# Patient Record
Sex: MALE | Race: WHITE | Employment: FULL TIME | URBAN - METROPOLITAN AREA
[De-identification: names, ages, dates, MRNs, and addresses within clinical notes are randomized per-mention and may not be internally consistent; named-entity substitution may affect disease eponyms.]

---

## 2017-08-18 ENCOUNTER — HOSPITAL ENCOUNTER (OUTPATIENT)
Age: 32
Discharge: HOME | End: 2017-08-18
Attending: PEDIATRICS
Payer: COMMERCIAL

## 2017-08-18 VITALS
RESPIRATION RATE: 22 BRPM | OXYGEN SATURATION: 99 % | HEART RATE: 100 BPM | DIASTOLIC BLOOD PRESSURE: 66 MMHG | TEMPERATURE: 99 F | SYSTOLIC BLOOD PRESSURE: 111 MMHG

## 2017-08-18 DIAGNOSIS — J01.90 ACUTE SINUSITIS, RECURRENCE NOT SPECIFIED, UNSPECIFIED LOCATION: Primary | ICD-10-CM

## 2017-08-18 DIAGNOSIS — J45.30 MILD PERSISTENT ASTHMA WITHOUT COMPLICATION: ICD-10-CM

## 2017-08-18 PROCEDURE — 99203 OFFICE O/P NEW LOW 30 MIN: CPT

## 2017-08-18 PROCEDURE — 99204 OFFICE O/P NEW MOD 45 MIN: CPT

## 2017-08-18 RX ORDER — AMOXICILLIN AND CLAVULANATE POTASSIUM 875; 125 MG/1; MG/1
1 TABLET, FILM COATED ORAL 2 TIMES DAILY
Qty: 20 TABLET | Refills: 0 | Status: SHIPPED | OUTPATIENT
Start: 2017-08-18 | End: 2017-08-28

## 2017-08-18 NOTE — ED NOTES
Pt given discharge instructions and prescriptions, verbalizes understanding. Denies further questions or needs. Encouraged to call with questions and go to ED with new or worsening symptoms.  Ambulatory out of immediate care with steady gait in no apparent

## 2017-08-18 NOTE — ED PROVIDER NOTES
Patient presents with:  Cough/URI      HPI:     Eduardo Gonzalez is a 27year old male who presents for evaluation of a chief complaint of upper respiratory symptoms for about 10 days. He states his symptoms started with a cold and clear nasal discharge.   This detailed discharge instructions for your condition today.     Follow Up with:  Madeleine in [facility]  [phone]    As needed

## 2017-08-18 NOTE — ED INITIAL ASSESSMENT (HPI)
Pt reports sinus pain, getting progressively worse over the last week, worse under eyes. Pt states he had a cold recently, had some clear nasal discharge but now states nasal discharge is now green in color. Denies fevers, sore throat.

## 2018-05-18 ENCOUNTER — CHARTING TRANS (OUTPATIENT)
Dept: OTHER | Age: 33
End: 2018-05-18

## 2018-11-01 VITALS
DIASTOLIC BLOOD PRESSURE: 80 MMHG | HEIGHT: 72 IN | WEIGHT: 265 LBS | TEMPERATURE: 98.6 F | BODY MASS INDEX: 35.89 KG/M2 | OXYGEN SATURATION: 98 % | HEART RATE: 97 BPM | SYSTOLIC BLOOD PRESSURE: 126 MMHG | RESPIRATION RATE: 18 BRPM

## 2021-10-14 ENCOUNTER — OFFICE VISIT (OUTPATIENT)
Dept: FAMILY MEDICINE CLINIC | Facility: CLINIC | Age: 36
End: 2021-10-14
Payer: COMMERCIAL

## 2021-10-14 VITALS
TEMPERATURE: 98 F | BODY MASS INDEX: 34.2 KG/M2 | DIASTOLIC BLOOD PRESSURE: 77 MMHG | RESPIRATION RATE: 16 BRPM | SYSTOLIC BLOOD PRESSURE: 124 MMHG | HEART RATE: 71 BPM | WEIGHT: 247 LBS | HEIGHT: 71.26 IN

## 2021-10-14 DIAGNOSIS — F41.9 ANXIETY: ICD-10-CM

## 2021-10-14 DIAGNOSIS — J45.20 MILD INTERMITTENT ASTHMA WITHOUT COMPLICATION: ICD-10-CM

## 2021-10-14 DIAGNOSIS — F32.A DEPRESSION, UNSPECIFIED DEPRESSION TYPE: ICD-10-CM

## 2021-10-14 DIAGNOSIS — Z00.00 ROUTINE PHYSICAL EXAMINATION: Primary | ICD-10-CM

## 2021-10-14 PROCEDURE — 99385 PREV VISIT NEW AGE 18-39: CPT | Performed by: FAMILY MEDICINE

## 2021-10-14 PROCEDURE — 3008F BODY MASS INDEX DOCD: CPT | Performed by: FAMILY MEDICINE

## 2021-10-14 PROCEDURE — 90471 IMMUNIZATION ADMIN: CPT | Performed by: FAMILY MEDICINE

## 2021-10-14 PROCEDURE — 90686 IIV4 VACC NO PRSV 0.5 ML IM: CPT | Performed by: FAMILY MEDICINE

## 2021-10-14 PROCEDURE — 3078F DIAST BP <80 MM HG: CPT | Performed by: FAMILY MEDICINE

## 2021-10-14 PROCEDURE — 3074F SYST BP LT 130 MM HG: CPT | Performed by: FAMILY MEDICINE

## 2021-10-14 RX ORDER — ARIPIPRAZOLE 5 MG/1
TABLET ORAL
COMMUNITY

## 2021-10-14 RX ORDER — DEXTROAMPHETAMINE/AMPHETAMINE 15 MG
CAPSULE, EXT RELEASE 24 HR ORAL
COMMUNITY
Start: 2021-10-05

## 2021-10-14 RX ORDER — ALBUTEROL SULFATE 90 UG/1
2 AEROSOL, METERED RESPIRATORY (INHALATION) EVERY 4 HOURS PRN
Qty: 1 EACH | Refills: 2 | Status: SHIPPED | OUTPATIENT
Start: 2021-10-14

## 2021-10-14 RX ORDER — BUPROPION HYDROCHLORIDE 150 MG/1
TABLET, EXTENDED RELEASE ORAL
COMMUNITY

## 2021-10-14 NOTE — PROGRESS NOTES
HPI:  43 yr old male who presents for physical.  New to clinic.  with a 2 yr girl. 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 Tries to walk and workout regularly. Lost 50+ pounds in the past few years. Eats healthy.      Diagnosed with asthma a rhythm; no murmurs  Lungs:  Clear to ausculation; good aeration               No wheezes, rales or rhonchi  Abd: soft, non-tender, non-distended          Normal bowel sounds; no masses          No hepatosplenomegaly  Extremities: No cyanosis, clubbing, mariel

## 2021-10-18 ENCOUNTER — PATIENT MESSAGE (OUTPATIENT)
Dept: FAMILY MEDICINE CLINIC | Facility: CLINIC | Age: 36
End: 2021-10-18

## 2021-10-18 NOTE — TELEPHONE ENCOUNTER
New patient, just had an establish care visit  on 10/14/21. Medication record not updated for Symbicort dose and SIG. [employer] message sent for clarification.          From: Manolo Schaefer  To: Rosie Smiley DO  Sent: 10/18/2021 12:22 PM CDT  Subject: Vidal Moreau

## 2021-10-20 RX ORDER — BUDESONIDE AND FORMOTEROL FUMARATE DIHYDRATE 80; 4.5 UG/1; UG/1
2 AEROSOL RESPIRATORY (INHALATION) 2 TIMES DAILY
Qty: 3 EACH | Refills: 1 | Status: SHIPPED | OUTPATIENT
Start: 2021-10-20

## 2022-04-11 ENCOUNTER — TELEPHONE (OUTPATIENT)
Dept: FAMILY MEDICINE CLINIC | Facility: CLINIC | Age: 37
End: 2022-04-11

## 2022-04-11 NOTE — TELEPHONE ENCOUNTER
Patient requesting to speak with Dr. Rafa Santos as soon as possible. Patient is having trouble obtaining his medication from his psychiatrist due to reception not allowing him to speak with her until his scheduled appointment in 4 weeks. patien would like to know if doctor Rafa Santos is able to refill until seen by his psychiatrist or assist him to obtain medication from her. Patient has been doing this for over a week with them with not success. Please advise. Patient is out.       ADDERALL 30 MG    LEXAPRIL 15 MG    WELBETRIN 150 MG

## 2022-04-12 NOTE — TELEPHONE ENCOUNTER
I am not very comfortable refilling these medications. Can we try and contact the Psychiatrist's office?   Thanks

## 2022-04-12 NOTE — TELEPHONE ENCOUNTER
Left message on patient's identified voicemail explaining that these medication refills should be coming from his psychiatrist and that all we can do is facilitate if he and the pharmacy are having problems contacting the psychiatrist. He would need to provide names and phone numbers for both the psychiatrist and the pharmacy that he regularly uses.

## 2022-09-19 RX ORDER — ALBUTEROL SULFATE 90 UG/1
2 AEROSOL, METERED RESPIRATORY (INHALATION) EVERY 4 HOURS PRN
Qty: 1 EACH | Refills: 2 | Status: SHIPPED | OUTPATIENT
Start: 2022-09-19

## 2022-09-20 RX ORDER — ALBUTEROL SULFATE 90 UG/1
2 AEROSOL, METERED RESPIRATORY (INHALATION) EVERY 4 HOURS PRN
Qty: 1 EACH | Refills: 2 | Status: SHIPPED | OUTPATIENT
Start: 2022-09-20

## 2023-10-05 ENCOUNTER — LAB ENCOUNTER (OUTPATIENT)
Dept: LAB | Age: 38
End: 2023-10-05
Attending: FAMILY MEDICINE
Payer: COMMERCIAL

## 2023-10-05 ENCOUNTER — OFFICE VISIT (OUTPATIENT)
Dept: FAMILY MEDICINE CLINIC | Facility: CLINIC | Age: 38
End: 2023-10-05

## 2023-10-05 VITALS
SYSTOLIC BLOOD PRESSURE: 120 MMHG | TEMPERATURE: 99 F | HEART RATE: 99 BPM | DIASTOLIC BLOOD PRESSURE: 76 MMHG | RESPIRATION RATE: 18 BRPM | HEIGHT: 71 IN | WEIGHT: 252 LBS | BODY MASS INDEX: 35.28 KG/M2

## 2023-10-05 DIAGNOSIS — F32.A DEPRESSION, UNSPECIFIED DEPRESSION TYPE: ICD-10-CM

## 2023-10-05 DIAGNOSIS — J45.20 MILD INTERMITTENT ASTHMA WITHOUT COMPLICATION: ICD-10-CM

## 2023-10-05 DIAGNOSIS — F41.9 ANXIETY: ICD-10-CM

## 2023-10-05 DIAGNOSIS — R39.89 URINARY PROBLEM: ICD-10-CM

## 2023-10-05 DIAGNOSIS — Z00.00 ROUTINE PHYSICAL EXAMINATION: ICD-10-CM

## 2023-10-05 DIAGNOSIS — Z00.00 ROUTINE PHYSICAL EXAMINATION: Primary | ICD-10-CM

## 2023-10-05 LAB
ALBUMIN SERPL-MCNC: 3.8 G/DL (ref 3.4–5)
ALBUMIN/GLOB SERPL: 0.9 {RATIO} (ref 1–2)
ALP LIVER SERPL-CCNC: 109 U/L
ALT SERPL-CCNC: 37 U/L
ANION GAP SERPL CALC-SCNC: 7 MMOL/L (ref 0–18)
AST SERPL-CCNC: 20 U/L (ref 15–37)
BILIRUB SERPL-MCNC: 0.3 MG/DL (ref 0.1–2)
BUN BLD-MCNC: 14 MG/DL (ref 7–18)
BUN/CREAT SERPL: 14.1 (ref 10–20)
CALCIUM BLD-MCNC: 9.1 MG/DL (ref 8.5–10.1)
CHLORIDE SERPL-SCNC: 106 MMOL/L (ref 98–112)
CHOLEST SERPL-MCNC: 216 MG/DL (ref ?–200)
CO2 SERPL-SCNC: 27 MMOL/L (ref 21–32)
CREAT BLD-MCNC: 0.99 MG/DL
DEPRECATED RDW RBC AUTO: 46.2 FL (ref 35.1–46.3)
EGFRCR SERPLBLD CKD-EPI 2021: 99 ML/MIN/1.73M2 (ref 60–?)
ERYTHROCYTE [DISTWIDTH] IN BLOOD BY AUTOMATED COUNT: 14.6 % (ref 11–15)
FASTING PATIENT LIPID ANSWER: NO
FASTING STATUS PATIENT QL REPORTED: NO
GLOBULIN PLAS-MCNC: 4.3 G/DL (ref 2.8–4.4)
GLUCOSE BLD-MCNC: 84 MG/DL (ref 70–99)
HCT VFR BLD AUTO: 42 %
HDLC SERPL-MCNC: 60 MG/DL (ref 40–59)
HGB BLD-MCNC: 13.6 G/DL
LDLC SERPL CALC-MCNC: 127 MG/DL (ref ?–100)
MCH RBC QN AUTO: 28.2 PG (ref 26–34)
MCHC RBC AUTO-ENTMCNC: 32.4 G/DL (ref 31–37)
MCV RBC AUTO: 87.1 FL
NONHDLC SERPL-MCNC: 156 MG/DL (ref ?–130)
OSMOLALITY SERPL CALC.SUM OF ELEC: 290 MOSM/KG (ref 275–295)
PLATELET # BLD AUTO: 330 10(3)UL (ref 150–450)
POTASSIUM SERPL-SCNC: 3.9 MMOL/L (ref 3.5–5.1)
PROT SERPL-MCNC: 8.1 G/DL (ref 6.4–8.2)
RBC # BLD AUTO: 4.82 X10(6)UL
SODIUM SERPL-SCNC: 140 MMOL/L (ref 136–145)
TRIGL SERPL-MCNC: 164 MG/DL (ref 30–149)
TSI SER-ACNC: 1.25 MIU/ML (ref 0.36–3.74)
VLDLC SERPL CALC-MCNC: 29 MG/DL (ref 0–30)
WBC # BLD AUTO: 9.4 X10(3) UL (ref 4–11)

## 2023-10-05 PROCEDURE — 83036 HEMOGLOBIN GLYCOSYLATED A1C: CPT

## 2023-10-05 PROCEDURE — 80061 LIPID PANEL: CPT

## 2023-10-05 PROCEDURE — 90471 IMMUNIZATION ADMIN: CPT | Performed by: FAMILY MEDICINE

## 2023-10-05 PROCEDURE — 36415 COLL VENOUS BLD VENIPUNCTURE: CPT

## 2023-10-05 PROCEDURE — 99396 PREV VISIT EST AGE 40-64: CPT | Performed by: FAMILY MEDICINE

## 2023-10-05 PROCEDURE — 84443 ASSAY THYROID STIM HORMONE: CPT

## 2023-10-05 PROCEDURE — 80053 COMPREHEN METABOLIC PANEL: CPT

## 2023-10-05 PROCEDURE — 85027 COMPLETE CBC AUTOMATED: CPT

## 2023-10-05 PROCEDURE — 90677 PCV20 VACCINE IM: CPT | Performed by: FAMILY MEDICINE

## 2023-10-05 RX ORDER — EPINEPHRINE 0.3 MG/.3ML
INJECTION SUBCUTANEOUS
COMMUNITY
Start: 2023-09-29

## 2023-10-05 RX ORDER — DEXTROAMPHETAMINE SULFATE, DEXTROAMPHETAMINE SACCHARATE, AMPHETAMINE SULFATE AND AMPHETAMINE ASPARTATE 7.5; 7.5; 7.5; 7.5 MG/1; MG/1; MG/1; MG/1
30 CAPSULE, EXTENDED RELEASE ORAL EVERY MORNING
COMMUNITY

## 2023-10-05 RX ORDER — ALBUTEROL SULFATE 90 UG/1
2 AEROSOL, METERED RESPIRATORY (INHALATION) EVERY 4 HOURS PRN
Qty: 1 EACH | Refills: 2 | Status: SHIPPED | OUTPATIENT
Start: 2023-10-05

## 2023-10-05 RX ORDER — ESCITALOPRAM OXALATE 5 MG/1
15 TABLET ORAL DAILY
COMMUNITY
Start: 2023-09-03

## 2023-10-06 LAB
EST. AVERAGE GLUCOSE BLD GHB EST-MCNC: 123 MG/DL (ref 68–126)
HBA1C MFR BLD: 5.9 % (ref ?–5.7)

## 2024-11-21 ENCOUNTER — OFFICE VISIT (OUTPATIENT)
Dept: FAMILY MEDICINE CLINIC | Facility: CLINIC | Age: 39
End: 2024-11-21
Payer: COMMERCIAL

## 2024-11-21 ENCOUNTER — LAB ENCOUNTER (OUTPATIENT)
Dept: LAB | Age: 39
End: 2024-11-21
Attending: FAMILY MEDICINE
Payer: COMMERCIAL

## 2024-11-21 VITALS
RESPIRATION RATE: 16 BRPM | WEIGHT: 252 LBS | HEART RATE: 103 BPM | SYSTOLIC BLOOD PRESSURE: 132 MMHG | HEIGHT: 71 IN | TEMPERATURE: 98 F | DIASTOLIC BLOOD PRESSURE: 84 MMHG | BODY MASS INDEX: 35.28 KG/M2

## 2024-11-21 DIAGNOSIS — J45.20 MILD INTERMITTENT ASTHMA WITHOUT COMPLICATION (HCC): ICD-10-CM

## 2024-11-21 DIAGNOSIS — R73.02 IMPAIRED GLUCOSE TOLERANCE: ICD-10-CM

## 2024-11-21 DIAGNOSIS — Z00.00 ROUTINE PHYSICAL EXAMINATION: ICD-10-CM

## 2024-11-21 DIAGNOSIS — Z00.00 ROUTINE PHYSICAL EXAMINATION: Primary | ICD-10-CM

## 2024-11-21 DIAGNOSIS — H92.01 DISCOMFORT OF RIGHT EAR: ICD-10-CM

## 2024-11-21 LAB
ALBUMIN SERPL-MCNC: 4.9 G/DL (ref 3.2–4.8)
ALBUMIN/GLOB SERPL: 1.5 {RATIO} (ref 1–2)
ALP LIVER SERPL-CCNC: 131 U/L
ALT SERPL-CCNC: 40 U/L
ANION GAP SERPL CALC-SCNC: 9 MMOL/L (ref 0–18)
AST SERPL-CCNC: 28 U/L (ref ?–34)
BILIRUB SERPL-MCNC: 0.6 MG/DL (ref 0.3–1.2)
BUN BLD-MCNC: 10 MG/DL (ref 9–23)
BUN/CREAT SERPL: 10.3 (ref 10–20)
CALCIUM BLD-MCNC: 10.1 MG/DL (ref 8.7–10.4)
CHLORIDE SERPL-SCNC: 102 MMOL/L (ref 98–112)
CHOLEST SERPL-MCNC: 207 MG/DL (ref ?–200)
CO2 SERPL-SCNC: 26 MMOL/L (ref 21–32)
CREAT BLD-MCNC: 0.97 MG/DL
DEPRECATED RDW RBC AUTO: 42.6 FL (ref 35.1–46.3)
EGFRCR SERPLBLD CKD-EPI 2021: 101 ML/MIN/1.73M2 (ref 60–?)
ERYTHROCYTE [DISTWIDTH] IN BLOOD BY AUTOMATED COUNT: 13.8 % (ref 11–15)
EST. AVERAGE GLUCOSE BLD GHB EST-MCNC: 126 MG/DL (ref 68–126)
FASTING PATIENT LIPID ANSWER: YES
FASTING STATUS PATIENT QL REPORTED: YES
GLOBULIN PLAS-MCNC: 3.3 G/DL (ref 2–3.5)
GLUCOSE BLD-MCNC: 85 MG/DL (ref 70–99)
HBA1C MFR BLD: 6 % (ref ?–5.7)
HCT VFR BLD AUTO: 43.5 %
HDLC SERPL-MCNC: 52 MG/DL (ref 40–59)
HGB BLD-MCNC: 14.3 G/DL
LDLC SERPL CALC-MCNC: 139 MG/DL (ref ?–100)
MCH RBC QN AUTO: 27.6 PG (ref 26–34)
MCHC RBC AUTO-ENTMCNC: 32.9 G/DL (ref 31–37)
MCV RBC AUTO: 83.8 FL
NONHDLC SERPL-MCNC: 155 MG/DL (ref ?–130)
OSMOLALITY SERPL CALC.SUM OF ELEC: 282 MOSM/KG (ref 275–295)
PLATELET # BLD AUTO: 365 10(3)UL (ref 150–450)
POTASSIUM SERPL-SCNC: 4.1 MMOL/L (ref 3.5–5.1)
PROT SERPL-MCNC: 8.2 G/DL (ref 5.7–8.2)
RBC # BLD AUTO: 5.19 X10(6)UL
SODIUM SERPL-SCNC: 137 MMOL/L (ref 136–145)
TRIGL SERPL-MCNC: 91 MG/DL (ref 30–149)
TSI SER-ACNC: 2.4 UIU/ML (ref 0.55–4.78)
VLDLC SERPL CALC-MCNC: 17 MG/DL (ref 0–30)
WBC # BLD AUTO: 10.6 X10(3) UL (ref 4–11)

## 2024-11-21 PROCEDURE — 83036 HEMOGLOBIN GLYCOSYLATED A1C: CPT

## 2024-11-21 PROCEDURE — 80061 LIPID PANEL: CPT

## 2024-11-21 PROCEDURE — 84443 ASSAY THYROID STIM HORMONE: CPT

## 2024-11-21 PROCEDURE — 80053 COMPREHEN METABOLIC PANEL: CPT

## 2024-11-21 PROCEDURE — 36415 COLL VENOUS BLD VENIPUNCTURE: CPT

## 2024-11-21 PROCEDURE — 85027 COMPLETE CBC AUTOMATED: CPT

## 2024-11-21 NOTE — PROGRESS NOTES
HPI: 39 yr old male who presents for physical.  with 4 yr old daughter. 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  Does not always eat healthy.     Saw Urology for BPH. Feels like he has a cantaloupe.  Just had imaging and it is enlarged.  Has trouble urinating at time. Just had US which showed prostatomegaly. Will follow-up.     On meds for anxiety, depression and ADHD.     Asthma is well-controlled. On Dupixent. Sees allergist.  Rarely uses Albuterol. Right ear has consistent pressure. Has tinnitus.     A1C was elevated last year.     PMHx: reviewed, see chart  PSHx: reviewed, see chart  All: Bananas and Walnuts   Meds: see chart    ROS:   Allergic/Immuno:  Negative for environmental allergies and food allergies  Cardiovascular:  Negative for chest pain and irregular heartbeat/palpitations  Constitutional:  Negative for decreased activity, fever, irritability and lethargy  Endocrine:  Negative for abnormal sleep patterns, increased activity, polydipsia and polyphagia  ENMT:  Positive for right ear discomfort  Eyes:  Negative for eye discharge and vision loss  Gastrointestinal:  Negative for abdominal pain, constipation, decreased appetite, diarrhea and vomiting  Genitourinary:  Negative for dysuria and hematuria  Hema/Lymph:  Negative for easy bleeding and easy bruising  Integumentary:  Negative for pruritus and rash  Musculoskeletal:  Negative for bone/joint symptoms  Neurological:  Negative for gait disturbance  Psychiatric:  Negative for inappropriate interaction and psychiatric symptoms    PE:  /84   Pulse 103   Temp 97.6 °F (36.4 °C) (Temporal)   Resp 16   Ht 5' 11\" (1.803 m)   Wt 252 lb (114.3 kg)   BMI 35.15 kg/m²   Gen:  Well-nourished.  No distress.  HEENT: Conjunctive clear.  Emanuel ear canals clear.  Emanuel TMs intact with good landmarks noted.  Nares patent.  Oral mucous membrane moist.  Normal lips, teeth, and gums.  Oropharynx normal.  Neck supple.  Good ROM.  No LAD.  Thyroid normal.  CV:   Regular rate and rhythm; no murmurs  Lungs:  Clear to ausculation; good aeration               No wheezes, rales or rhonchi  Abd: soft, non-tender, non-distended          Normal bowel sounds; no masses          No hepatosplenomegaly  Extremities: No cyanosis, clubbing, edema.  Pedal pulses 2+ odessa.  MSK:  No abnormalities.  Skin: Warm/dry/intact.  No abnormal moles/lesions noted.  No rashes.    A/P:  Encounter Diagnoses   Name Primary?    Routine physical examination    Encouraged regular exercise and healthy diet. Labs done   Yes    Mild intermittent asthma without complication (HCC)    Controlled.  CPM       Discomfort of right ear    No abnormal exam findings.  Refer to ENT if pain persists.        Impaired glucose tolerance    Check hemoglobin A1C      Colleen Weiler,

## 2024-11-30 ENCOUNTER — V-VISIT (OUTPATIENT)
Dept: FAMILY MEDICINE | Age: 39
End: 2024-11-30

## 2024-11-30 ENCOUNTER — E-ADVICE (OUTPATIENT)
Dept: FAMILY MEDICINE | Age: 39
End: 2024-11-30

## 2024-11-30 DIAGNOSIS — J00 ACUTE NASOPHARYNGITIS: Primary | ICD-10-CM

## 2024-11-30 RX ORDER — BENZONATATE 200 MG/1
200 CAPSULE ORAL 3 TIMES DAILY PRN
Qty: 21 CAPSULE | Refills: 0 | Status: SHIPPED | OUTPATIENT
Start: 2024-11-30

## 2025-05-15 ENCOUNTER — OFFICE VISIT (OUTPATIENT)
Dept: FAMILY MEDICINE CLINIC | Facility: CLINIC | Age: 40
End: 2025-05-15
Payer: COMMERCIAL

## 2025-05-15 VITALS
HEIGHT: 71.18 IN | HEART RATE: 102 BPM | DIASTOLIC BLOOD PRESSURE: 82 MMHG | WEIGHT: 259 LBS | TEMPERATURE: 98 F | BODY MASS INDEX: 35.86 KG/M2 | RESPIRATION RATE: 16 BRPM | SYSTOLIC BLOOD PRESSURE: 132 MMHG

## 2025-05-15 DIAGNOSIS — R73.02 IMPAIRED GLUCOSE TOLERANCE: ICD-10-CM

## 2025-05-15 DIAGNOSIS — E66.812 CLASS 2 OBESITY DUE TO EXCESS CALORIES WITHOUT SERIOUS COMORBIDITY WITH BODY MASS INDEX (BMI) OF 35.0 TO 35.9 IN ADULT: ICD-10-CM

## 2025-05-15 DIAGNOSIS — E66.09 CLASS 2 OBESITY DUE TO EXCESS CALORIES WITHOUT SERIOUS COMORBIDITY WITH BODY MASS INDEX (BMI) OF 35.0 TO 35.9 IN ADULT: ICD-10-CM

## 2025-05-15 DIAGNOSIS — G47.33 MILD OBSTRUCTIVE SLEEP APNEA: Primary | ICD-10-CM

## 2025-05-15 PROCEDURE — G2211 COMPLEX E/M VISIT ADD ON: HCPCS | Performed by: FAMILY MEDICINE

## 2025-05-15 PROCEDURE — 3075F SYST BP GE 130 - 139MM HG: CPT | Performed by: FAMILY MEDICINE

## 2025-05-15 PROCEDURE — 99214 OFFICE O/P EST MOD 30 MIN: CPT | Performed by: FAMILY MEDICINE

## 2025-05-15 PROCEDURE — 3008F BODY MASS INDEX DOCD: CPT | Performed by: FAMILY MEDICINE

## 2025-05-15 PROCEDURE — 3079F DIAST BP 80-89 MM HG: CPT | Performed by: FAMILY MEDICINE

## 2025-05-15 RX ORDER — LORAZEPAM 1 MG/1
TABLET ORAL
COMMUNITY
Start: 2025-04-18

## 2025-05-15 RX ORDER — TIRZEPATIDE 2.5 MG/.5ML
2.5 INJECTION, SOLUTION SUBCUTANEOUS WEEKLY
Qty: 2 ML | Refills: 0 | Status: SHIPPED | OUTPATIENT
Start: 2025-05-15 | End: 2025-06-06

## 2025-05-15 NOTE — PROGRESS NOTES
HPI: Lalo is a 39 year old male who presents for sleep apnea. Saw sleep medicine and was diagnosed with mild to moderate apnea. Suggested CPAP, mouthguard or Zepbound. Pt is concerned about his ability to tolerate the CPAP.  Pt is interested in Zepbound. Met with allergist yesterday who feels like it may benefit him. BMI- 35.9. No history of pancreatitis. No history of thyroid cancer. Last A1C was 6.0.     PMH:  Past Medical History[1]   Alg:  Bananas and Walnuts   Meds: Medications Ordered Prior to Encounter[2]   Tobacco Use: no    ROS: see HPI    Objective:   Gen: AOx3. NAD.  /82   Pulse 102   Temp 98 °F (36.7 °C) (Temporal)   Resp 16   Ht 5' 11.18\" (1.808 m)   Wt 259 lb (117.5 kg)   BMI 35.94 kg/m²       Assessment:/Plan:  Encounter Diagnoses   Name Primary?    Mild obstructive sleep apnea Yes    Impaired glucose tolerance     Class 2 obesity due to excess calories without serious comorbidity with body mass index (BMI) of 35.0 to 35.9 in adult          Diagnosed with mild to moderate sleep apnea and would like to try and lose weight to treat the apnea.  Both allergist and sleep medicine doctor agree.  Pt has tried conservative measures without improvement. Will send in Zepbound.  Discussed potential side effects.  F/U one month.     Colleen Weiler, DO               [1]   Past Medical History:   Anxiety    Asthma (HCC)    Attention deficit disorder    Depression   [2]   Current Outpatient Medications on File Prior to Visit   Medication Sig Dispense Refill    LORazepam 1 MG Oral Tab       Dupilumab 300 MG/2ML Subcutaneous Solution Prefilled Syringe every 14 (fourteen) days.      budeson-glycopyrrol-formoterol 160-9-4.8 MCG/ACT Inhalation Aerosol       ADDERALL XR 30 MG Oral Capsule SR 24 Hr Take 1 capsule (30 mg total) by mouth every morning.      escitalopram 5 MG Oral Tab Take 3 tablets (15 mg total) by mouth daily.      Multiple Vitamin (MULTIVITAMIN ADULT OR) Take by mouth.      Cyanocobalamin  (VITAMIN B 12 OR) Take by mouth.      Omega-3 Fatty Acids (FISH OIL OR) Take by mouth.      albuterol (PROAIR HFA) 108 (90 Base) MCG/ACT Inhalation Aero Soln Inhale 2 puffs into the lungs every 4 (four) hours as needed for Wheezing. 1 each 2    buPROPion 150 MG Oral Tablet 12 Hr       Fluticasone Propionate (FLONASE NA) by Nasal route.      EPINEPHrine 0.3 MG/0.3ML Injection Solution Auto-injector        No current facility-administered medications on file prior to visit.

## 2025-05-17 ENCOUNTER — PATIENT MESSAGE (OUTPATIENT)
Dept: FAMILY MEDICINE CLINIC | Facility: CLINIC | Age: 40
End: 2025-05-17

## 2025-05-19 ENCOUNTER — TELEPHONE (OUTPATIENT)
Dept: FAMILY MEDICINE CLINIC | Facility: CLINIC | Age: 40
End: 2025-05-19

## 2025-05-19 NOTE — TELEPHONE ENCOUNTER
Lalo Short Rn Triage (supporting Colleen M Weiler, DO)2 days ago       Hi, I followed up with the pharmacy and they indicated that insurance is requesting prior authorization for the Zepbound. Please let me know if you require anything from me. Thanks!

## 2025-05-20 NOTE — TELEPHONE ENCOUNTER
Approved    Prior authorization approved  Payer: [facility] Case ID: [id number]    [phone]  Note from payer: Your request was approved based on the initial information provided at the time of the coverage request submission. Please allow additional time for the final decision to be made and added to the patient's account.  Electronic appeal: Not supported

## 2025-05-21 NOTE — TELEPHONE ENCOUNTER
Approved    Prior authorization approved  Payer: [employer] [facility] Case ID: [id number]    [phone]  Note from payer: The case has been Approved from  [id number] to [id number]  Approval Details    Authorized from 2025 to May 20, 2026  Electronic appeal: Not supported  View History  Notes     Time User Attachment    Await chart notes 2025  3:33 PM Maxine Ruby CMA       Pharmacy Benefits   Verify Pharmacy Benefits TAMIE YANG [id number] ([facility])    Covered: Retail, Mail Order    Unknown: Specialty, Long-Term Care  Member ID: [id number] BIN: [id number] : 1985   Group ID: [id number] PCN: [id number] Legal sex: M   Group name: ACTIVE   Address: [address] [place] [phone]    Medication Being Authorized    Tirzepatide-Weight Management (ZEPBOUND) 2.5 MG/0.5ML Subcutaneous Solution Auto-injector  Inject 2.5 mg into the skin once a week for 4 doses.  Dispense: 2 mL Refills: 0   Start: 5/15/2025 End: 2025   Class: Normal   This order has been released to its destination.  To be filled at: [id number] - [place], [place] - [address] [place], [place] [address]lem Sabi [phone], [phone]

## 2025-06-16 ENCOUNTER — OFFICE VISIT (OUTPATIENT)
Dept: FAMILY MEDICINE CLINIC | Facility: CLINIC | Age: 40
End: 2025-06-16

## 2025-06-16 VITALS
DIASTOLIC BLOOD PRESSURE: 80 MMHG | WEIGHT: 258 LBS | HEIGHT: 71.14 IN | SYSTOLIC BLOOD PRESSURE: 132 MMHG | BODY MASS INDEX: 35.72 KG/M2 | HEART RATE: 98 BPM | TEMPERATURE: 98 F | RESPIRATION RATE: 16 BRPM

## 2025-06-16 DIAGNOSIS — F32.A DEPRESSION, UNSPECIFIED DEPRESSION TYPE: ICD-10-CM

## 2025-06-16 DIAGNOSIS — J45.20 MILD INTERMITTENT ASTHMA WITHOUT COMPLICATION (HCC): ICD-10-CM

## 2025-06-16 DIAGNOSIS — G47.33 MILD OBSTRUCTIVE SLEEP APNEA: ICD-10-CM

## 2025-06-16 DIAGNOSIS — Z00.00 ROUTINE PHYSICAL EXAMINATION: Primary | ICD-10-CM

## 2025-06-16 DIAGNOSIS — E66.09 CLASS 2 OBESITY DUE TO EXCESS CALORIES WITHOUT SERIOUS COMORBIDITY WITH BODY MASS INDEX (BMI) OF 35.0 TO 35.9 IN ADULT: ICD-10-CM

## 2025-06-16 DIAGNOSIS — F90.9 ATTENTION DEFICIT HYPERACTIVITY DISORDER (ADHD), UNSPECIFIED ADHD TYPE: ICD-10-CM

## 2025-06-16 DIAGNOSIS — N40.0 BENIGN PROSTATIC HYPERPLASIA WITHOUT LOWER URINARY TRACT SYMPTOMS: ICD-10-CM

## 2025-06-16 DIAGNOSIS — E66.812 CLASS 2 OBESITY DUE TO EXCESS CALORIES WITHOUT SERIOUS COMORBIDITY WITH BODY MASS INDEX (BMI) OF 35.0 TO 35.9 IN ADULT: ICD-10-CM

## 2025-06-16 DIAGNOSIS — R73.02 IMPAIRED GLUCOSE TOLERANCE: ICD-10-CM

## 2025-06-16 DIAGNOSIS — F41.9 ANXIETY: ICD-10-CM

## 2025-06-16 LAB — HEMOGLOBIN A1C: 5.6 % (ref 4.3–5.6)

## 2025-06-16 RX ORDER — TIRZEPATIDE 5 MG/.5ML
5 INJECTION, SOLUTION SUBCUTANEOUS WEEKLY
Qty: 2 ML | Refills: 0 | Status: SHIPPED | OUTPATIENT
Start: 2025-06-16 | End: 2025-07-08

## 2025-06-16 RX ORDER — TIRZEPATIDE 2.5 MG/.5ML
INJECTION, SOLUTION SUBCUTANEOUS
COMMUNITY
Start: 2025-05-20 | End: 2025-06-16 | Stop reason: DRUGHIGH

## 2025-06-16 NOTE — PROGRESS NOTES
Subjective:   Lalo Munroe is a 39 year old male who presents for Routine Physical       History/Other:   History of Present Illness  Lalo Munroe is a 39 year old male with impaired glucose tolerance and sleep apnea who presents for follow-up on Zepbound treatment and routine physical.     He has been on Zepbound for one month, taking a dose of 2.5 mg weekly. Initially, he experienced sluggishness during the first week of treatment, but this has since improved. He occasionally experiences brief waves of nausea, which resolve with rest and hydration. No significant weight change has been noted. His hemoglobin A1c has improved from 6 to 5.6, indicating better glycemic control.    He has a history of sleep apnea, which may improve with weight loss facilitated by Zepbound. He also manages asthma with medication and reports it is well-controlled. He receives monthly allergy shots and has shown significant improvement in lung function. No chest pain, shortness of breath, or constipation. Occasional knee discomfort is noted, attributed to his weight and flat feet.    He has a history of urinary symptoms and previously saw a urologist who performed an ultrasound showing a slightly enlarged prostate, which was deemed normal for his age. He is not currently on medication for this issue and reports stable symptoms. He is looking for a new urologist as his current one is retiring.    He is currently taking Adderall in the morning, which sometimes causes him to forget to eat, leading to evening hunger. Zepbound has reduced his appetite and 'food noise', leading to less interest in food and more mindful eating habits. He engages in regular physical activity, including walks and yard work, and reports improved energy levels after the initial week of Zepbound treatment.    He sees a psychiatrist for anxiety, depression, and ADHD, with stable symptoms on current treatment. He sees a therapist bi-weekly and an allergist for asthma  management. His father is on Mounjaro for diabetes management. His mother and another family member are unable to qualify for certain medications due to insurance issues.     Chief Complaint Reviewed and Verified  No Further Nursing Notes to   Review  Tobacco Reviewed  Allergies Reviewed         Tobacco:  He has never smoked tobacco.    Current Medications[1]    PHQ-2 SCORE: 2  , done 6/16/2025   Little interest or pleasure in doing things: 1    Feeling down, depressed, or hopeless: 1    Trouble falling or staying asleep, or sleeping too much: 2     Feeling bad about yourself - or that you are a failure or have let yourself or your family down: 1    Trouble concentrating on things, such as reading the newspaper or watching television: 2    Last [place] Suicide Screening on 6/16/2025 was No Risk.       Review of Systems:  ROS:   Allergic/Immuno:  Negative for environmental allergies and food allergies  Cardiovascular:  Negative for chest pain and irregular heartbeat/palpitations  Constitutional:  Negative for decreased activity, fever, irritability and lethargy  Endocrine:  Negative for abnormal sleep patterns, increased activity, polydipsia and polyphagia  ENMT:  Negative for ear drainage, hearing loss and nasal drainage  Eyes:  Negative for eye discharge and vision loss  Gastrointestinal:  Negative for abdominal pain, constipation, decreased appetite, diarrhea and vomiting  Genitourinary:  Negative for dysuria and hematuria  Hema/Lymph:  Negative for easy bleeding and easy bruising  Integumentary:  Negative for pruritus and rash  Musculoskeletal:  Negative for bone/joint symptoms  Neurological:  Negative for gait disturbance  Psychiatric:  Negative for inappropriate interaction and psychiatric symptoms      Objective:   /80   Pulse 98   Temp 98 °F (36.7 °C) (Temporal)   Resp 16   Ht 5' 11.14\" (1.807 m)   Wt 258 lb (117 kg)   BMI 35.84 kg/m²  Estimated body mass index is 35.84 kg/m² as calculated  from the following:    Height as of this encounter: 5' 11.14\" (1.807 m).    Weight as of this encounter: 258 lb (117 kg).  Results  LABS  Hemoglobin A1c: 5.6%       Physical Exam      Gen:  Well-nourished.  No distress.  HEENT: Conjunctive clear.  Emanuel ear canals clear.  Emanuel TMs intact with good landmarks noted.  Nares patent.  Oral mucous membrane moist.  Normal lips, teeth, and gums.  Oropharynx normal.  Neck supple.  Good ROM.  No LAD.  Thyroid normal.  CV:  Regular rate and rhythm; no murmurs  Lungs:  Clear to ausculation; good aeration               No wheezes, rales or rhonchi  Abd: soft, non-tender, non-distended          Normal bowel sounds; no masses          No hepatosplenomegaly  Extremities: No cyanosis, clubbing, edema.  Pedal pulses 2+ emanuel.  MSK:  No abnormalities.  Skin: Warm/dry/intact.  No abnormal moles/lesions noted.  No rashes.        Assessment & Plan:   1. Routine physical examination (Primary)  -     CBC, Platelet; No Differential; Future; Expected date: 06/16/2025  -     Comp Metabolic Panel (14); Future; Expected date: 06/16/2025  -     Hemoglobin A1C; Future; Expected date: 06/16/2025  -     Lipid Panel; Future; Expected date: 06/16/2025  -     Assay, Thyroid Stim Hormone; Future; Expected date: 06/16/2025  2. Impaired glucose tolerance  -     POC Hemoglobin A1C  3. Mild obstructive sleep apnea  4. Benign prostatic hyperplasia without lower urinary tract symptoms  -     Urology Referral - In Network  5. Mild intermittent asthma without complication (HCC)  6. Anxiety  7. Depression, unspecified depression type  8. Attention deficit hyperactivity disorder (ADHD), unspecified ADHD type  Other orders  -     Zepbound; Inject 5 mg into the skin once a week for 4 doses.  Dispense: 2 mL; Refill: 0    Assessment & Plan  Obesity  He is on Zepbound for weight management. Initially experienced sluggishness and nausea, but these symptoms have improved. Weight has remained stable on the current low dose.  He is interested in increasing the dose to 5 mg to potentially enhance weight loss. He is aware of the need to monitor food intake to avoid discomfort and is experiencing reduced appetite, which is a positive outcome of the medication. He is aware that increasing the dose may intensify initial side effects.  - Increase Zepbound dose to 5 mg  - [facility] prescription to [facility]  - Monitor weight and symptoms    Impaired glucose toelrance  Hemoglobin A1c has improved from 6.0% to 5.6%, indicating good glycemic control. The improvement is likely due to the current treatment regimen, including Zepbound.    Obstructive Sleep Apnea  He has obstructive sleep apnea, which is expected to improve with weight loss from Zepbound. He is hopeful that the combination of weight loss and asthma medication will reduce symptoms.    Asthma  Asthma is well-controlled with current treatment. He receives monthly allergy shots and has shown significant improvement in lung function, reaching 110% of expected lung volume.    Benign Prostatic Hyperplasia  He has a slightly enlarged prostate, which is not unusual for his age. Symptoms are stable, and no medication is currently being taken for this condition. He will need to find a new urologist as the previous one is retiring. He is considering a vasectomy and prefers a urologist who can perform the procedure.  - Provide referral to a local urologist  - Ensure new urologist can perform vasectomy    Anxiety, Depression, and ADHD  He is under the care of a psychiatrist and therapist for anxiety, depression, and ADHD. The conditions are well-controlled with the current treatment regimen, and no changes have been made recently. The psychiatrist is aware of his use of Zepbound. He has not experienced any negative mental health effects from Zepbound and reports a possible improvement in mental health.    General Health Maintenance  He is up to date on vaccinations and screenings.  - Schedule an  eye exam    Follow-up  - Schedule follow-up appointment in one month  - Order blood work for the fall to assess changes after six months on Zepbound        No follow-ups on file.        Colleen Weiler, DO, 6/16/2025, 10:56 AM           [1]   Current Outpatient Medications   Medication Sig Dispense Refill    Tirzepatide-Weight Management (ZEPBOUND) 5 MG/0.5ML Subcutaneous Solution Auto-injector Inject 5 mg into the skin once a week for 4 doses. 2 mL 0    LORazepam 1 MG Oral Tab       Dupilumab 300 MG/2ML Subcutaneous Solution Prefilled Syringe every 14 (fourteen) days.      budeson-glycopyrrol-formoterol 160-9-4.8 MCG/ACT Inhalation Aerosol       ADDERALL XR 30 MG Oral Capsule SR 24 Hr Take 1 capsule (30 mg total) by mouth every morning.      EPINEPHrine 0.3 MG/0.3ML Injection Solution Auto-injector       escitalopram 5 MG Oral Tab Take 3 tablets (15 mg total) by mouth daily.      Multiple Vitamin (MULTIVITAMIN ADULT OR) Take by mouth.      Cyanocobalamin (VITAMIN B 12 OR) Take by mouth.      Omega-3 Fatty Acids (FISH OIL OR) Take by mouth.      albuterol (PROAIR HFA) 108 (90 Base) MCG/ACT Inhalation Aero Soln Inhale 2 puffs into the lungs every 4 (four) hours as needed for Wheezing. 1 each 2    buPROPion 150 MG Oral Tablet 12 Hr       Fluticasone Propionate (FLONASE NA) by Nasal route.

## 2025-06-16 NOTE — PROGRESS NOTES
The following individual(s) verbally consented to be recorded using ambient AI listening technology and understand that they can each withdraw their consent to this listening technology at any point by asking the clinician to turn off or pause the recording:    Patient name: Lalo PAM Mckeemitchell  Additional names:

## 2025-07-15 ENCOUNTER — OFFICE VISIT (OUTPATIENT)
Dept: FAMILY MEDICINE CLINIC | Facility: CLINIC | Age: 40
End: 2025-07-15
Payer: COMMERCIAL

## 2025-07-15 VITALS
TEMPERATURE: 98 F | WEIGHT: 252 LBS | RESPIRATION RATE: 16 BRPM | DIASTOLIC BLOOD PRESSURE: 79 MMHG | SYSTOLIC BLOOD PRESSURE: 133 MMHG | BODY MASS INDEX: 34.89 KG/M2 | HEART RATE: 99 BPM | HEIGHT: 71.14 IN

## 2025-07-15 DIAGNOSIS — E66.09 CLASS 2 OBESITY DUE TO EXCESS CALORIES WITHOUT SERIOUS COMORBIDITY WITH BODY MASS INDEX (BMI) OF 35.0 TO 35.9 IN ADULT: Primary | ICD-10-CM

## 2025-07-15 DIAGNOSIS — G47.33 MILD OBSTRUCTIVE SLEEP APNEA: ICD-10-CM

## 2025-07-15 DIAGNOSIS — E66.812 CLASS 2 OBESITY DUE TO EXCESS CALORIES WITHOUT SERIOUS COMORBIDITY WITH BODY MASS INDEX (BMI) OF 35.0 TO 35.9 IN ADULT: Primary | ICD-10-CM

## 2025-07-15 PROCEDURE — G2211 COMPLEX E/M VISIT ADD ON: HCPCS | Performed by: FAMILY MEDICINE

## 2025-07-15 PROCEDURE — 3078F DIAST BP <80 MM HG: CPT | Performed by: FAMILY MEDICINE

## 2025-07-15 PROCEDURE — 3075F SYST BP GE 130 - 139MM HG: CPT | Performed by: FAMILY MEDICINE

## 2025-07-15 PROCEDURE — 3008F BODY MASS INDEX DOCD: CPT | Performed by: FAMILY MEDICINE

## 2025-07-15 PROCEDURE — 99213 OFFICE O/P EST LOW 20 MIN: CPT | Performed by: FAMILY MEDICINE

## 2025-07-15 RX ORDER — TIRZEPATIDE 7.5 MG/.5ML
7.5 INJECTION, SOLUTION SUBCUTANEOUS WEEKLY
Qty: 2 ML | Refills: 0 | Status: SHIPPED | OUTPATIENT
Start: 2025-07-15 | End: 2025-08-06

## 2025-07-15 RX ORDER — TIRZEPATIDE 5 MG/.5ML
INJECTION, SOLUTION SUBCUTANEOUS
COMMUNITY
End: 2025-07-15 | Stop reason: DRUGHIGH

## 2025-07-15 NOTE — PROGRESS NOTES
Subjective:   Lalo Munroe is a 40 year old male who presents for Weight Check       History/Other:   History of Present Illness  Lalo Munroe is a 40 year old male who presents for follow-up regarding medication effects and weight loss.    He has been on Zepbound for weight management and has lost six pounds over the past eight weeks while on a dose of five milligrams. He monitors his weight using a scale that sends data to his phone. He experiences a decrease in appetite, often feeling full after consuming three-quarters of a meal. He recalls one instance of overeating, which left him feeling uncomfortably full for an extended period. Occasionally, he experiences nausea, which he attributes to not eating for a while, and this sensation usually resolves after eating or taking a moment to 'ride it out'.    Since starting Zepbound, he has noticed an improvement in his sleep quality and generally feels better. No significant sleep changes aside from improved sleep quality. He feels generally well.    He is currently using two auto-injectors, Zepbound and Dupixent, and administers injections approximately six times a month. He occasionally experiences discomfort during injections but generally manages well.    He has been engaging in more physical activity, including walking his dogs and gardening, especially now that the weather is cooler.   Chief Complaint Reviewed and Verified  No Further Nursing Notes to   Review  Tobacco Reviewed  Allergies Reviewed  Medications Reviewed         Tobacco:  He has never smoked tobacco.    Current Medications[1]    Review of Systems:  See HPI      Objective:   /79   Pulse 99   Temp 98 °F (36.7 °C) (Temporal)   Resp 16   Ht 5' 11.14\" (1.807 m)   Wt 252 lb (114.3 kg)   BMI 35.01 kg/m²  Estimated body mass index is 35.01 kg/m² as calculated from the following:    Height as of this encounter: 5' 11.14\" (1.807 m).    Weight as of this encounter: 252 lb (114.3  kg).  Results         Physical Exam          Assessment & Plan:   1. Class 2 obesity due to excess calories without serious comorbidity with body mass index (BMI) of 35.0 to 35.9 in adult (Primary)  2. Mild obstructive sleep apnea  Other orders  -     Zepbound; Inject 7.5 mg into the skin once a week for 4 doses.  Dispense: 2 mL; Refill: 0    Assessment & Plan  Obesity  He has been on Tirzepatide (Zepbound) 5 mg for eight weeks, resulting in a weight loss of six pounds, aligning with the expected range of one to two pounds per week. He reports early satiety and occasional nausea when fasting, which resolves with food intake. He is considering increasing the dose to 7.5 mg to enhance weight loss, acknowledging potential initial side effects. He is aware that if the increased dose is excessive, he can revert to the 5 mg dose for a couple of months before attempting to increase again.  - Increase Tirzepatide (Zepbound) to 7.5 mg subcutaneously once a week.    Mild obstructive sleep apnea  He reports improved sleep quality, potentially related to weight loss and the effects of Tirzepatide.    General Health Maintenance  He is engaging in more physical activity, including walking dogs and gardening, contributing positively to overall health and weight management.  - Encourage continued physical activity and exercise.        No follow-ups on file.        Colleen Weiler, DO, 7/15/2025, 10:50 AM           [1]   Current Outpatient Medications   Medication Sig Dispense Refill    Tirzepatide-Weight Management (ZEPBOUND) 7.5 MG/0.5ML Subcutaneous Solution Auto-injector Inject 7.5 mg into the skin once a week for 4 doses. 2 mL 0    LORazepam 1 MG Oral Tab       Dupilumab 300 MG/2ML Subcutaneous Solution Prefilled Syringe every 14 (fourteen) days.      budeson-glycopyrrol-formoterol 160-9-4.8 MCG/ACT Inhalation Aerosol       ADDERALL XR 30 MG Oral Capsule SR 24 Hr Take 1 capsule (30 mg total) by mouth every morning.       EPINEPHrine 0.3 MG/0.3ML Injection Solution Auto-injector       escitalopram 5 MG Oral Tab Take 3 tablets (15 mg total) by mouth daily.      Multiple Vitamin (MULTIVITAMIN ADULT OR) Take by mouth.      Cyanocobalamin (VITAMIN B 12 OR) Take by mouth.      Omega-3 Fatty Acids (FISH OIL OR) Take by mouth.      albuterol (PROAIR HFA) 108 (90 Base) MCG/ACT Inhalation Aero Soln Inhale 2 puffs into the lungs every 4 (four) hours as needed for Wheezing. 1 each 2    buPROPion 150 MG Oral Tablet 12 Hr       Fluticasone Propionate (FLONASE NA) by Nasal route.

## 2025-08-05 ENCOUNTER — PATIENT MESSAGE (OUTPATIENT)
Dept: FAMILY MEDICINE CLINIC | Facility: CLINIC | Age: 40
End: 2025-08-05

## 2025-08-08 RX ORDER — TIRZEPATIDE 7.5 MG/.5ML
7.5 INJECTION, SOLUTION SUBCUTANEOUS WEEKLY
Qty: 2 ML | Refills: 0 | Status: SHIPPED | OUTPATIENT
Start: 2025-08-08 | End: 2025-08-30

## (undated) NOTE — LETTER
ASTHMA ACTION PLAN for Be Penaloza     : 1985     Date: 10/05/23  Doctor:  Karol Maki,   Phone for doctor or clinic: [facility], [address] [phone]      ACT Score: 21    ACT Goal: 20 or greater    Call your provider if you require your rescue/quick reliever medication more than 2-3 times in a 24 hour period. If you require your rescue inhaler/medication more than 2-3 times weekly, your asthma may not be under proper control and you should seek medical attention. *Quick Relievers are Xopenex and Albuterol*    You can use the colors of a traffic light to help learn about your asthma medicines. Year Round       1. Green - Go! % of Personal Best Peak Flow   Use controller medicine. Breathing is good  No cough or wheeze  Can work and play Medicine How much to take When to take it    Medications       Sympathomimetics Instructions     budeson-glycopyrrol-formoterol 160-9-4.8 MCG/ACT Inhalation Aerosol         albuterol (PROAIR HFA) 108 (90 Base) MCG/ACT Inhalation Aero Soln    Inhale 2 puffs into the lungs every 4 (four) hours as needed for Wheezing. 2. Yellow - Caution. 50-79% Personal Best Peak Flow  Use reliever medicine to keep an asthma attack from getting bad. Cough  Quick Relievers  Wheezing  Tight Chest  Wake up at night Medicine How much to take When to take it    If symptoms are not improving in 24-48 hrs, call office for further instructions  Medications       Sympathomimetics Instructions     budeson-glycopyrrol-formoterol 160-9-4.8 MCG/ACT Inhalation Aerosol         albuterol (PROAIR HFA) 108 (90 Base) MCG/ACT Inhalation Aero Soln    Inhale 2 puffs into the lungs every 4 (four) hours as needed for Wheezing. 3. Red - Stop!  Danger! <50% Personal Best Peak Flow  Continue Controller Medications But ADD:   Medicine not helping  Breathing is hard and fast  Nose opens wide  Can't walk  Ribs show  Can't talk well Medicine How much to take When to take it    If your symptoms do not improve in ONE hour -  go to the emergency room or call 911 immediately! If symptoms improve, call office for appointment immediately. Albuterol inhaler 2 puffs every 20 minutes for three treatments       Don't forget:  Rinse mouth after using inhaler  Use spacer for inhaler  Remember to get your Flu vaccine every fall! [x] Asthma Action Plan reviewed with the caregiver and patient, and a copy of the plan was given to the patient/caregiver. [] Asthma Action Plan reviewed with the caregiver and patient on the phone, and copy mailed to patient/caregiver or sent via [address]      Signatures:   Provider  Federico Bernard, DO Patient  Deven Velázquez

## (undated) NOTE — LETTER
ASTHMA ACTION PLAN for Lalo Munroe     : 1985     Date: 24  Doctor:  Colleen Weiler, DO  Phone for doctor or clinic: [facility], [address] [address] [phone]      ACT Score: 21    ACT Goal: 20 or greater    Call your provider if you require your rescue/quick reliever medication more than 2-3 times in a 24 hour period.    If you require your rescue inhaler/medication more than 2-3 times weekly, your asthma may not be under proper control and you should seek medical attention.    *Quick Relievers are Xopenex and Albuterol*    You can use the colors of a traffic light to help learn about your asthma medicines.  Year Round       1. Green - Go! % of Personal Best Peak Flow   Use controller medicine.   Breathing is good  No cough or wheeze  Can work and play Medicine How much to take When to take it    Medications       Sympathomimetics Instructions     budeson-glycopyrrol-formoterol 160-9-4.8 MCG/ACT Inhalation Aerosol      Patient not taking: Reported on 2024     albuterol (PROAIR HFA) 108 (90 Base) MCG/ACT Inhalation Aero Soln Inhale 2 puffs into the lungs every 4 (four) hours as needed for Wheezing.     Patient not taking: Reported on 2024                    2. Yellow - Caution. 50-79% Personal Best Peak Flow  Use reliever medicine to keep an asthma attack from getting bad.   Cough  Quick Relievers  Wheezing  Tight Chest  Wake up at night Medicine How much to take When to take it    If symptoms are not improving in 24-48 hrs, call office for further instructions  Medications       Sympathomimetics Instructions     budeson-glycopyrrol-formoterol 160-9-4.8 MCG/ACT Inhalation Aerosol      Patient not taking: Reported on 2024     albuterol (PROAIR HFA) 108 (90 Base) MCG/ACT Inhalation Aero Soln Inhale 2 puffs into the lungs every 4 (four) hours as needed for Wheezing.     Patient not taking: Reported on 2024                     3. Red - Stop! Danger! <50% Personal Best Peak Flow  Continue Controller Medications But ADD:   Medicine not helping  Breathing is hard and fast  Nose opens wide  Can't walk  Ribs show  Can't talk well Medicine How much to take When to take it    If your symptoms do not improve in ONE hour -  go to the emergency room or call 911 immediately! If symptoms improve, call office for appointment immediately.    Albuterol inhaler 2 puffs every 20 minutes for three treatments       Don't forget:  Rinse mouth after using inhaler  Use spacer for inhaler  Remember to get your Flu vaccine every fall!    [x] Asthma Action Plan reviewed with the caregiver and patient, and a copy of the plan was given to the patient/caregiver.   [] Asthma Action Plan reviewed with the caregiver and patient on the phone, and copy mailed to patient/caregiver or sent via [employer].     Signatures:   Provider  Colleen Weiler, DO Patient  Lalo Munroe Caretaker